# Patient Record
Sex: FEMALE | Race: WHITE | NOT HISPANIC OR LATINO | Employment: FULL TIME | URBAN - METROPOLITAN AREA
[De-identification: names, ages, dates, MRNs, and addresses within clinical notes are randomized per-mention and may not be internally consistent; named-entity substitution may affect disease eponyms.]

---
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
## 2021-08-05 ENCOUNTER — HOSPITAL ENCOUNTER (EMERGENCY)
Facility: CLINIC | Age: 21
Discharge: HOME | End: 2021-08-05
Attending: NURSE PRACTITIONER | Admitting: NURSE PRACTITIONER
Payer: COMMERCIAL

## 2021-08-05 ENCOUNTER — NURSE TRIAGE (OUTPATIENT)
Dept: NURSING | Facility: CLINIC | Age: 21
End: 2021-08-05

## 2021-08-05 ENCOUNTER — OFFICE VISIT (OUTPATIENT)
Dept: OBGYN | Facility: CLINIC | Age: 21
End: 2021-08-05
Payer: COMMERCIAL

## 2021-08-05 VITALS
SYSTOLIC BLOOD PRESSURE: 122 MMHG | OXYGEN SATURATION: 97 % | WEIGHT: 225 LBS | HEART RATE: 85 BPM | DIASTOLIC BLOOD PRESSURE: 75 MMHG | RESPIRATION RATE: 18 BRPM | TEMPERATURE: 98.1 F

## 2021-08-05 VITALS — SYSTOLIC BLOOD PRESSURE: 124 MMHG | WEIGHT: 225 LBS | DIASTOLIC BLOOD PRESSURE: 78 MMHG

## 2021-08-05 DIAGNOSIS — Z30.430 ENCOUNTER FOR INSERTION OF INTRAUTERINE CONTRACEPTIVE DEVICE: ICD-10-CM

## 2021-08-05 DIAGNOSIS — R10.2 PELVIC CRAMPING: ICD-10-CM

## 2021-08-05 DIAGNOSIS — Z11.3 ROUTINE SCREENING FOR STI (SEXUALLY TRANSMITTED INFECTION): Primary | ICD-10-CM

## 2021-08-05 PROCEDURE — 250N000013 HC RX MED GY IP 250 OP 250 PS 637: Performed by: NURSE PRACTITIONER

## 2021-08-05 PROCEDURE — 58300 INSERT INTRAUTERINE DEVICE: CPT | Performed by: ADVANCED PRACTICE MIDWIFE

## 2021-08-05 PROCEDURE — 99283 EMERGENCY DEPT VISIT LOW MDM: CPT

## 2021-08-05 PROCEDURE — 87491 CHLMYD TRACH DNA AMP PROBE: CPT | Performed by: ADVANCED PRACTICE MIDWIFE

## 2021-08-05 PROCEDURE — 87591 N.GONORRHOEAE DNA AMP PROB: CPT | Performed by: ADVANCED PRACTICE MIDWIFE

## 2021-08-05 RX ORDER — ALBUTEROL SULFATE 90 UG/1
2 AEROSOL, METERED RESPIRATORY (INHALATION)
COMMUNITY
Start: 2020-02-28 | End: 2021-10-05

## 2021-08-05 RX ORDER — COPPER 313.4 MG/1
1 INTRAUTERINE DEVICE INTRAUTERINE ONCE
Status: COMPLETED
Start: 2021-08-05 | End: 2021-08-05

## 2021-08-05 RX ORDER — IBUPROFEN 600 MG/1
600 TABLET, FILM COATED ORAL ONCE
Status: COMPLETED | OUTPATIENT
Start: 2021-08-05 | End: 2021-08-05

## 2021-08-05 RX ORDER — FLUTICASONE PROPIONATE 50 MCG
1 SPRAY, SUSPENSION (ML) NASAL DAILY
COMMUNITY

## 2021-08-05 RX ORDER — COPPER 313.4 MG/1
1 INTRAUTERINE DEVICE INTRAUTERINE ONCE
COMMUNITY

## 2021-08-05 RX ADMIN — COPPER 1 EACH: 313.4 INTRAUTERINE DEVICE INTRAUTERINE at 15:34

## 2021-08-05 RX ADMIN — IBUPROFEN 600 MG: 600 TABLET, FILM COATED ORAL at 21:04

## 2021-08-05 ASSESSMENT — ENCOUNTER SYMPTOMS
VOMITING: 1
NAUSEA: 1
APPETITE CHANGE: 1
ABDOMINAL PAIN: 1

## 2021-08-05 NOTE — PROGRESS NOTES
IUD Insertion:  CONSULT:    Marimar presents to clinic today for Nexplanon removal. Has had hair loss and mood swings on the Nexplanon, desires hormonal free contraception. Interested in the paragard IUD. After reviewing the benefits of inserting the Paragard prior to removing the Nexplanon, visit converted to IUD insertion.    Is a pregnancy test required: No.  Was a consent obtained?  Yes    Subjective: Marimar Domínguez is a 21 year old  presents for IUD and desires Paragard type IUD.    Patient has been given the opportunity to ask questions about all forms of birth control, including all options appropriate for Marimar Domínguez. Discussed that no method of birth control, except abstinence is 100% effective against pregnancy or sexually transmitted infection.     Marimar Domínguez understands she may have the IUD removed at any time. IUD should be removed by a health care provider.    The entire insertion procedure was reviewed with the patient, including care after placement.    No LMP recorded. Patient has had an implant.   HCG not indicated  No allergy to betadine or shellfish.   Patient accepts STI screening    /78 (BP Location: Left arm, Cuff Size: Adult Large)   Wt 102.1 kg (225 lb)     Pelvic Exam:   EG/BUS: normal genital architecture without lesions, erythema or abnormal secretions.   Vagina: speculum exam attempted with small speculum-- unable to visualize cervix. Attempted again with medium speculum-- cervix just visible to patient's L, partially obscured by vaginal tissue. Able to retract tissue with cotton swab to apply speculum. If patient tolerates, consider large speculum for next exam. Tissue moist, pink, rugae with physiologic discharge and secretions  Cervix: nulliparous, no lesions and pink, moist, closed, without lesion or CMT    PROCEDURE NOTE: -- IUD Insertion    Reason for Insertion: contraception    Under sterile technique, cervix was visualized with speculum and prepped with  Betadine solution swab x 3. Tenaculum was placed for stability. The uterus was gently straightened and sounded to 8.0 cm. IUD prepared for placement, and IUD inserted according to 's instructions without difficulty or significant resitance, and deployed at the fundus. The strings were visualized and trimmed to 3.0 cm from the external os. Tenaculum was removed and hemostasis noted. Speculum removed.  Patient tolerated procedure well.    Lot # [id number]  Exp: Jan 2027    EBL: minimal    Complications: none    ASSESSMENT:     ICD-10-CM    1. Routine screening for STI (sexually transmitted infection)  Z11.3 NEISSERIA GONORRHOEA PCR     CHLAMYDIA TRACHOMATIS PCR   2. Encounter for insertion of intrauterine contraceptive device  Z30.430 paragard intrauterine copper device     paragard intrauterine copper IUD device 1 each     INSERTION INTRAUTERINE DEVICE        PLAN:    Given 's handouts, including when to have IUD removed, list of danger s/sx, side effects and follow up recommended. Encouraged condom use for prevention of STD. Advised to call for any fever, for prolonged or severe pain or bleeding, abnormal vaginal discharge, or unable to palpate strings. She was advised to use pain medications (ibuprofen) as needed for mild to moderate pain. Advised to follow-up in clinic in 4-6 weeks for IUD string check if unable to find strings or as directed by provider.     RTC as desired for Nexplanon removal.    EULALIO Garcia CNM

## 2021-08-05 NOTE — LETTER
August 5, 2021      To Whom It May Concern:      Marimar Domínguez was seen in our Emergency Department today, 08/05/21.  I expect her condition to improve over the next 2 days.  She may return to work when improved.    Sincerely,        Case العلي RN

## 2021-08-06 LAB
C TRACH DNA SPEC QL NAA+PROBE: NEGATIVE
N GONORRHOEA DNA SPEC QL NAA+PROBE: NEGATIVE

## 2021-08-06 NOTE — ED PROVIDER NOTES
History   Chief Complaint:  Pelvic Pain     The history is provided by the patient.      Marimar Domínguez is a 21 year old female who presents with pelvic pain. The patient reports that she had an IUD placed at about 1300 today and has had cramping abdominal pain since then. She took two ibuprofen for the pain at 1400 and has been using a heat pack as well. She has also experienced spotting, vomiting x 1, and decreased appetite.     Review of Systems   Constitutional: Positive for appetite change.   Gastrointestinal: Positive for abdominal pain, nausea and vomiting.   Genitourinary: Positive for vaginal bleeding.   All other systems reviewed and are negative.      Allergies:  Kiwi  Pineapple    Medications:  The patient is not currently taking any daily medications.     Past Medical History:    Asthma   Scoliosis     Past Surgical History:    The patient denies past surgical history.     Family History:    The patient denies past family history.     Social History:  PCP: Katarina Quiñones  Presents to the ED with boyfriend.    Physical Exam     Patient Vitals for the past 24 hrs:   BP Temp Temp src Pulse Resp SpO2 Weight   08/05/21 2130 -- -- -- 85 -- 97 % --   08/05/21 2039 134/84 98.1  F (36.7  C) Temporal 102 16 99 % 102.1 kg (225 lb)       Physical Exam  General: Alert, No obvious discomfort, well kept, obese  Eyes: PERRL, conjunctivae pink no scleral icterus or conjunctival injection  ENT:   Moist mucus membranes, posterior oropharynx clear without erythema or exudates, No lymphadenopathy, Normal voice  Resp:  Lungs clear to auscultation bilaterally, no crackles/rubs/wheezes. Good air movement  CV:  Normal rate and rhythm, no murmurs/rubs/gallops  GI:  Abdomen soft and non-distended.  Normoactive BS.  No tenderness, guarding or rebound, No masses  Skin:  Warm, dry.  No rashes or petechiae  Musculoskeletal: No peripheral edema or calf tenderness, Normal gross ROM   Neuro: Alert and oriented to  person/place/time, normal sensation  Psychiatric: Normal affect, cooperative, good eye contact      Emergency Department Course     Emergency Department Course:  Reviewed:  I reviewed the patient's nursing notes, vitals, past medical records, Care Everywhere.     Assessments:  2042 I performed an assessment and examination of the patient as noted above.      2047 Findings and plan explained to the Patient. Patient discharged home with instructions regarding supportive care, medications, and reasons to return. The importance of close follow-up was reviewed.     Interventions:  2104 Ibuprofen 600 mg, PO    Disposition:  The patient was discharged to home.     Impression & Plan   Medical Decision Making:  Marimar Domínguez is a 21 year old female who presents today for evaluation of abdominal cramping and spotting post IUD placement.  Patient was initially advised that she might have some mild cramping and spotting therefore when she felt more intense cramping and had an episode of vomiting she was concerned and presented for evaluation.  On examination she has a nontender nonsurgical abdomen.  Patient has no evidence of discomfort.  She is ambulatory around the room without difficulty.  She is advised on appropriate expectations for cramping and spotting post IUD placement.  Appropriate doses of NSAIDs were discussed.  She will otherwise follow-up with primary care or obstetrician for any further concerns.  Return protocols were discussed.  She appears to be safe and appropriate for outpatient management follow-up and is discharged home.        Covid-19  Marimar Domínguez was evaluated during a global COVID-19 pandemic, which necessitated consideration that the patient might be at risk for infection with the SARS-CoV-2 virus that causes COVID-19.   Applicable protocols for evaluation were followed during the patient's care.     Diagnosis:    ICD-10-CM    1. Pelvic cramping  R10.2      Scribe Disclosure:  [facility],  am serving as a scribe at 8:42 PM on 8/5/2021 to document services personally performed by Ashwin Malik APRN based on my observations and the provider's statements to me.       Ashwin Malik APRN CNP  08/05/21 [id number]

## 2021-08-06 NOTE — TELEPHONE ENCOUNTER
Pt clled in states she had IUD put in today.  It is the copper one.  The Pt states she has abdominal cramp.  The symptom started 2 PM after the IUD insertion.  Has bleeding.  The pain is 10/10 on the scale.  The pain medication is not helping.  No fever.  The disposition is to be seen at the ED.  Care advice given per protocol.  Patient agrees with care advice given.   Agreed to call back if he has additional symptoms or questions.      Tico Ross [place] [facility] 8/5/2021 7:58 PM        Reason for Disposition    [1] SEVERE abdominal pain (e.g., excruciating) AND [2] present > 1 hour    Additional Information    Negative: Shock suspected (e.g., cold/pale/clammy skin, too weak to stand, low BP, rapid pulse)    Negative: Sounds like a life-threatening emergency to the triager    Negative: [1] Abdominal pain AND [2] pregnant < 20 weeks    Negative: [1] Vaginal bleeding AND [2] pregnant < 20 weeks    Negative: Vaginal discharge is main symptom    Protocols used: CONTRACEPTION - IUD SYMPTOMS AND [id number]

## 2021-08-06 NOTE — DISCHARGE INSTRUCTIONS
Again I suspect that you will have some cramping and spotting for the next up to a week.  Take ibuprofen 600 mg every 6 hours or naproxen 2 tablets twice daily as needed for discomfort.  You may also use Tylenol.  Contact your obstetrician if you have any further concerns.  Return to the ER if you develop fevers, uncontrolled nausea or vomiting, significant vaginal bleeding (more then a pad and hour for more than 3 hours), or for other concerns.

## 2021-09-18 ENCOUNTER — HEALTH MAINTENANCE LETTER (OUTPATIENT)
Age: 21
End: 2021-09-18

## 2021-10-05 ENCOUNTER — OFFICE VISIT (OUTPATIENT)
Dept: URGENT CARE | Facility: URGENT CARE | Age: 21
End: 2021-10-05
Payer: COMMERCIAL

## 2021-10-05 VITALS
DIASTOLIC BLOOD PRESSURE: 70 MMHG | HEART RATE: 96 BPM | OXYGEN SATURATION: 98 % | SYSTOLIC BLOOD PRESSURE: 110 MMHG | WEIGHT: 228.6 LBS | RESPIRATION RATE: 16 BRPM | TEMPERATURE: 98.5 F

## 2021-10-05 DIAGNOSIS — R05.9 COUGH IN ADULT: Primary | ICD-10-CM

## 2021-10-05 DIAGNOSIS — Z20.822 SUSPECTED COVID-19 VIRUS INFECTION: ICD-10-CM

## 2021-10-05 DIAGNOSIS — J45.21 MILD INTERMITTENT ASTHMA WITH ACUTE EXACERBATION: ICD-10-CM

## 2021-10-05 PROCEDURE — U0003 INFECTIOUS AGENT DETECTION BY NUCLEIC ACID (DNA OR RNA); SEVERE ACUTE RESPIRATORY SYNDROME CORONAVIRUS 2 (SARS-COV-2) (CORONAVIRUS DISEASE [COVID-19]), AMPLIFIED PROBE TECHNIQUE, MAKING USE OF HIGH THROUGHPUT TECHNOLOGIES AS DESCRIBED BY CMS-2020-01-R: HCPCS | Performed by: PHYSICIAN ASSISTANT

## 2021-10-05 PROCEDURE — 99204 OFFICE O/P NEW MOD 45 MIN: CPT | Performed by: PHYSICIAN ASSISTANT

## 2021-10-05 PROCEDURE — U0005 INFEC AGEN DETEC AMPLI PROBE: HCPCS | Performed by: PHYSICIAN ASSISTANT

## 2021-10-05 RX ORDER — ALBUTEROL SULFATE 90 UG/1
1 AEROSOL, METERED RESPIRATORY (INHALATION) EVERY 4 HOURS PRN
Qty: 6.7 G | Refills: 0 | Status: SHIPPED | OUTPATIENT
Start: 2021-10-05

## 2021-10-05 NOTE — PROGRESS NOTES
Assessment/Plan:    No acute distress or toxicity noted. Lungs CTAB, no signs of pneumonia. Suspect viral illness. Supportive treatments discussed.  No wheezing currently but pt notes wheezing intermittently. Rx albuterol for asthma exacerbation.    Discussed that symptoms do overlap with possible COVID-19, and patient was tested for this today. Isolate while awaiting test results.     See patient instructions below.  At the end of the encounter, I discussed results, diagnosis, medications. Discussed red flags for immediate return to clinic/ER, as well as indications for follow up if no improvement. Patient understood and agreed to plan. Patient was stable for discharge.      ICD-10-CM    1. Cough in adult  R05.9    2. Suspected COVID-19 virus infection  Z20.822 Symptomatic COVID-19 Virus (Coronavirus) by PCR Nose   3. Mild intermittent asthma with acute exacerbation  J45.21 albuterol (PROAIR HFA/PROVENTIL HFA/VENTOLIN HFA) 108 (90 Base) MCG/ACT inhaler         Return in about 1 week (around 10/12/2021).    MARIANO Huynh, RAFAEL  [facility]    ------------------------------------------------------------------------------------------------------------------------------------------------------------------------  HPI:  Marimar Domínguez is a 21 year old female wit history of asthma & allergic rhinitis who presents for evaluation of cough, sore throat, wheezing, fever, myalgias, and nausea onset 2 days ago. Tmax 101 F today. She takes Allegra & Flonase for allergies, and albuterol PRN for asthma but doesn't currently have an albuterol inhaler. No treatments tried. Patient reports no nasal congestion, loss of sense of taste or smell, headache, chest pain, shortness of breath, abdominal pain,  vomiting, diarrhea, rash, or any other symptoms. No known sick contacts/COVID exposure; she works in a .      No past medical history on file.    Vitals:    10/05/21 1313   BP: 110/70   BP  Location: Right arm   Patient Position: Chair   Cuff Size: Adult Large   Pulse: 96   Resp: 16   Temp: 98.5  F (36.9  C)   TempSrc: Oral   SpO2: 98%   Weight: 103.7 kg (228 lb 9.6 oz)       Physical Exam  Vitals and nursing note reviewed.   HENT:      Right Ear: Tympanic membrane normal.      Left Ear: Tympanic membrane normal.      Mouth/Throat:      Mouth: Mucous membranes are moist.      Pharynx: Oropharynx is clear.   Cardiovascular:      Rate and Rhythm: Normal rate and regular rhythm.      Heart sounds: Normal heart sounds.   Pulmonary:      Effort: Pulmonary effort is normal.      Breath sounds: Normal breath sounds.   Neurological:      Mental Status: She is alert.         Labs/Imaging:  No results found for this or any previous visit (from the past 24 hour(s)).      Patient Instructions     Adult Self-Care for Colds  Colds are caused by viruses. They can t be cured with antibiotics. However, you can relieve symptoms and support your body s efforts to heal itself. No matter which symptoms you have, be sure to drink plenty of fluids (water or clear soup); stop smoking and drinking alcohol; and get plenty of rest.      Understand a fever    Take your temperature several times a day. If your fever is 100.4 F (38.0 C) for more than a day, call your doctor.    Relax, lie down. Go to bed if you want. Just get off your feet and rest. Also, drink plenty of fluids to avoid dehydration.    Take acetaminophen or a nonsteroidal anti-inflammatory agent (NSAID), such as ibuprofen.  Treat a troubled nose kindly    Breathe steam or heated humidified air to open blocked nasal passages.  a hot shower or use a vaporizer. Be careful not to get burned by the steam.    Saline nasal sprays and Mucinex help open a stuffy nose. Antihistamines can also help, but they can cause side effects such as drowsiness and drying of the eyes, nose, and mouth.  Soothe a sore throat and cough    Gargle every 2 hours with 1/4 teaspoon of  salt dissolved in 1/2 cup of warm water. Suck on throat lozenges and cough drops to moisten your throat (those containing menthol work best).    Cough medicines are available but it is unclear how effective they actually are.    Try honey for cough    Take acetaminophen or an NSAID, such as ibuprofen to ease throat pain  Ease digestive problems    Put fluid back into your body. Take frequent sips of clear liquids such as water or broth. Do not drink beverages with a lot of sugar in them, such as juices and sodas. These can make diarrhea worse. Older children and adults can drink sports drinks.    As your appetite returns, you can resume your normal diet. Ask your doctor whether there are any foods you should avoid.  When to seek medical care  When you first notice symptoms, ask your health care provider if antiviral medications are appropriate. Antibiotics should not be taken for colds or flu. Also, call your doctor if you have any of the following symptoms or if you aren t feeling better after 7 days:    Shortness of breath    Pain or pressure in the chest or abdomen    Worsening symptoms, especially after a period of improvement    Fever of 100.4 F  (38.0 C) or higher, or fever that doesn t go down with medication    Sudden dizziness or confusion    Severe or continued vomiting    Signs of dehydration, including extreme thirst, dark urine, infrequent urination, dry mouth    Spotted, red, or very sore throat      7137-3462 [employer]. [address] [place] [address], [place] [address]. All rights reserved. This information is not intended as a substitute for professional medical care. Always follow your healthcare professional's instructions.    Your COVID test results should be available within 3 days, but please allow up to 1 week. If you have MyChart, your COVID test results will be visible there. If you do not have MyChart, you will be called if your test is positive and sent a letter if your test is  "negative.  Please continue to isolate yourself until you receive your results.    Discharge Instructions for COVID-19 Patients  You have--or may have--COVID-19. Please follow the instructions listed below.   If you have a weakened immune system, discuss with your doctor any other actions you need to take.  How can I protect others?  If you have symptoms (fever, cough, body aches or trouble breathing):    Stay home and away from others (self-isolate) until:  ? Your other symptoms have resolved (gotten better). And   ? You've had no fever--and no medicine that reduces fever--for 1 full day (24 hours). And   ? At least 10 days have passed since your symptoms started. (You may need to wait 20 days. Follow the advice of your care team.)  If you don't show symptoms, but testing showed that you have COVID-19:    Stay home and away from others (self-isolate) until at least 10 days have passed since the date of your first positive COVID-19 test.  During this time    Stay in your own room, even for meals. Use your own bathroom if you can.    Stay away from others in your home. No hugging, kissing or shaking hands. No visitors.    Don't go to work, school or anywhere else.    Clean \"high touch\" surfaces often (doorknobs, counters, handles). Use household cleaning spray or wipes.    You'll find a full list of  on the EPA website: www.epa.gov/pesticide-registration/list-n-disinfectants-use-against-sars-cov-2.    Cover your mouth and nose with a mask or other face covering to avoid spreading germs.    Wash your hands and face often. Use soap and water.    Caregivers in these groups are at risk for severe illness due to COVID-19:  ? People 65 years and older  ? People who live in a nursing home or long-term care facility  ? People with chronic disease (lung, heart, cancer, diabetes, kidney, liver, immunologic)  ? People who have a weakened immune system, including those who:    Are in cancer treatment    Take medicine that " weakens the immune system, such as corticosteroids    Had a bone marrow or organ transplant    Have an immune deficiency    Have poorly controlled HIV or AIDS    Are obese (body mass index of 40 or higher)    Smoke regularly    Caregivers should wear gloves while washing dishes, handling laundry and cleaning bedrooms and bathrooms.    Use caution when washing and drying laundry: Don't shake dirty laundry and use the warmest water setting that you can.    For more tips on managing your health at home, go to www.cdc.gov/coronavirus/2019-ncov/downloads/10Things.pdf.  How can I take care of myself at home?  1. Get lots of rest. Drink extra fluids (unless a doctor has told you not to).  2. Take Tylenol (acetaminophen) for fever or pain. If you have liver or kidney problems, ask your family doctor if it's okay to take Tylenol.   Adults can take either:   ? 650 mg (two 325 mg pills) every 4 to 6 hours, or   ? 1,000 mg (two 500 mg pills) every 8 hours as needed.  ? Note: Don't take more than 3,000 mg in one day. Acetaminophen is found in many medicines (both prescribed and over-the-counter medicines). Read all labels to be sure you don't take too much.   For children, check the Tylenol bottle for the right dose. The dose is based on the child's age or weight.  3. If you have other health problems (like cancer, heart failure, an organ transplant or severe kidney disease): Call your specialty clinic if you don't feel better in the next 2 days.  4. Know when to call 911. Emergency warning signs include:  ? Trouble breathing or shortness of breath  ? Pain or pressure in the chest that doesn't go away  ? Feeling confused like you haven't felt before, or not being able to wake up  ? Bluish-colored lips or face  5. Your doctor may have prescribed a blood thinner medicine. Follow their instructions.  Where can I get more information?     [facility] - About COVID-19:   [email or web address]    CDC - What to  Do If You're Sick: www.cdc.gov/coronavirus/2019-ncov/about/steps-when-sick.html    CDC - Ending Home Isolation: www.cdc.gov/coronavirus/2019-ncov/hcp/disposition-in-home-patients.html    CDC - Caring for Someone: www.cdc.gov/coronavirus/2019-ncov/if-you-are-sick/care-for-someone.html    [facility] - Interim Guidance for Hospital Discharge to Home: www.health.[facility].mn./diseases/coronavirus/hcp/hospdischarge.pdf    Below are the COVID-19 hotlines at the [place] Department of Health ([facility]). Interpreters are available.  ? For health questions: Call 110-387-6050 or 1-912.786.2558 (7 a.m. to 7 p.m.)  ? For questions about schools and childcare: Call 694-277-8839 or 1-426.800.8085 (7 a.m. to 7 p.m.)    For informational purposes only. Not to replace the advice of your health care provider. Clinically reviewed by Dr. Davie Moncada.   Copyright   2020 [employer]. All rights reserved. Clip [id number] - REV 01/05/21.

## 2021-10-05 NOTE — PATIENT INSTRUCTIONS
Adult Self-Care for Colds  Colds are caused by viruses. They can t be cured with antibiotics. However, you can relieve symptoms and support your body s efforts to heal itself. No matter which symptoms you have, be sure to drink plenty of fluids (water or clear soup); stop smoking and drinking alcohol; and get plenty of rest.      Understand a fever    Take your temperature several times a day. If your fever is 100.4 F (38.0 C) for more than a day, call your doctor.    Relax, lie down. Go to bed if you want. Just get off your feet and rest. Also, drink plenty of fluids to avoid dehydration.    Take acetaminophen or a nonsteroidal anti-inflammatory agent (NSAID), such as ibuprofen.  Treat a troubled nose kindly    Breathe steam or heated humidified air to open blocked nasal passages.  a hot shower or use a vaporizer. Be careful not to get burned by the steam.    Saline nasal sprays and Mucinex help open a stuffy nose. Antihistamines can also help, but they can cause side effects such as drowsiness and drying of the eyes, nose, and mouth.  Soothe a sore throat and cough    Gargle every 2 hours with 1/4 teaspoon of salt dissolved in 1/2 cup of warm water. Suck on throat lozenges and cough drops to moisten your throat (those containing menthol work best).    Cough medicines are available but it is unclear how effective they actually are.    Try honey for cough    Take acetaminophen or an NSAID, such as ibuprofen to ease throat pain  Ease digestive problems    Put fluid back into your body. Take frequent sips of clear liquids such as water or broth. Do not drink beverages with a lot of sugar in them, such as juices and sodas. These can make diarrhea worse. Older children and adults can drink sports drinks.    As your appetite returns, you can resume your normal diet. Ask your doctor whether there are any foods you should avoid.  When to seek medical care  When you first notice symptoms, ask your health care  provider if antiviral medications are appropriate. Antibiotics should not be taken for colds or flu. Also, call your doctor if you have any of the following symptoms or if you aren t feeling better after 7 days:    Shortness of breath    Pain or pressure in the chest or abdomen    Worsening symptoms, especially after a period of improvement    Fever of 100.4 F  (38.0 C) or higher, or fever that doesn t go down with medication    Sudden dizziness or confusion    Severe or continued vomiting    Signs of dehydration, including extreme thirst, dark urine, infrequent urination, dry mouth    Spotted, red, or very sore throat      6267-4411 [employer]. [address] [place], [place] [id number]. All rights reserved. This information is not intended as a substitute for professional medical care. Always follow your healthcare professional's instructions.    Your COVID test results should be available within 3 days, but please allow up to 1 week. If you have MyChart, your COVID test results will be visible there. If you do not have MyChart, you will be called if your test is positive and sent a letter if your test is negative.  Please continue to isolate yourself until you receive your results.    Discharge Instructions for COVID-19 Patients  You have--or may have--COVID-19. Please follow the instructions listed below.   If you have a weakened immune system, discuss with your doctor any other actions you need to take.  How can I protect others?  If you have symptoms (fever, cough, body aches or trouble breathing):    Stay home and away from others (self-isolate) until:  ? Your other symptoms have resolved (gotten better). And   ? You've had no fever--and no medicine that reduces fever--for 1 full day (24 hours). And   ? At least 10 days have passed since your symptoms started. (You may need to wait 20 days. Follow the advice of your care team.)  If you don't show symptoms, but testing showed that you have  "COVID-19:    Stay home and away from others (self-isolate) until at least 10 days have passed since the date of your first positive COVID-19 test.  During this time    Stay in your own room, even for meals. Use your own bathroom if you can.    Stay away from others in your home. No hugging, kissing or shaking hands. No visitors.    Don't go to work, school or anywhere else.    Clean \"high touch\" surfaces often (doorknobs, counters, handles). Use household cleaning spray or wipes.    You'll find a full list of  on the EPA website: www.epa.gov/pesticide-registration/list-n-disinfectants-use-against-sars-cov-2.    Cover your mouth and nose with a mask or other face covering to avoid spreading germs.    Wash your hands and face often. Use soap and water.    Caregivers in these groups are at risk for severe illness due to COVID-19:  ? People 65 years and older  ? People who live in a nursing home or long-term care facility  ? People with chronic disease (lung, heart, cancer, diabetes, kidney, liver, immunologic)  ? People who have a weakened immune system, including those who:    Are in cancer treatment    Take medicine that weakens the immune system, such as corticosteroids    Had a bone marrow or organ transplant    Have an immune deficiency    Have poorly controlled HIV or AIDS    Are obese (body mass index of 40 or higher)    Smoke regularly    Caregivers should wear gloves while washing dishes, handling laundry and cleaning bedrooms and bathrooms.    Use caution when washing and drying laundry: Don't shake dirty laundry and use the warmest water setting that you can.    For more tips on managing your health at home, go to www.cdc.gov/coronavirus/2019-ncov/downloads/10Things.pdf.  How can I take care of myself at home?  1. Get lots of rest. Drink extra fluids (unless a doctor has told you not to).  2. Take Tylenol (acetaminophen) for fever or pain. If you have liver or kidney problems, ask your family doctor " if it's okay to take Tylenol.   Adults can take either:   ? 650 mg (two 325 mg pills) every 4 to 6 hours, or   ? 1,000 mg (two 500 mg pills) every 8 hours as needed.  ? Note: Don't take more than 3,000 mg in one day. Acetaminophen is found in many medicines (both prescribed and over-the-counter medicines). Read all labels to be sure you don't take too much.   For children, check the Tylenol bottle for the right dose. The dose is based on the child's age or weight.  3. If you have other health problems (like cancer, heart failure, an organ transplant or severe kidney disease): Call your specialty clinic if you don't feel better in the next 2 days.  4. Know when to call 911. Emergency warning signs include:  ? Trouble breathing or shortness of breath  ? Pain or pressure in the chest that doesn't go away  ? Feeling confused like you haven't felt before, or not being able to wake up  ? Bluish-colored lips or face  5. Your doctor may have prescribed a blood thinner medicine. Follow their instructions.  Where can I get more information?    [facility] - About COVID-19:   [email or web address]    CDC - What to Do If You're Sick: www.cdc.gov/coronavirus/2019-ncov/about/steps-when-sick.html    CDC - Ending Home Isolation: www.cdc.gov/coronavirus/2019-ncov/hcp/disposition-in-home-patients.html    CDC - Caring for Someone: www.cdc.gov/coronavirus/2019-ncov/if-you-are-sick/care-for-someone.html    [facility] - Interim Guidance for Hospital Discharge to Home: www.health.[facility].mn.us/diseases/coronavirus/hcp/hospdischarge.pdf    Below are the COVID-19 hotlines at the [place] Department of Health ([facility]). Interpreters are available.  ? For health questions: Call 676-147-1104 or 1-811.555.4934 (7 a.m. to 7 p.m.)  ? For questions about schools and childcare: Call 760-288-0991 or 1-856.212.1299 (7 a.m. to 7 p.m.)    For informational purposes only. Not to replace the advice of your health care provider. Clinically  reviewed by Dr. Davie Moncada.   Copyright   2020 [facility]. All rights reserved. [employer] [id number] - REV 01/05/21.

## 2021-10-05 NOTE — LETTER
[facility]  [address]  [facility] [address]  Phone: [phone]  Fax: [phone]    October 5, 2021        Marimar Domínguez  [address] [facility] [place] [address]          To whom it may concern:    RE: Marimar Domínguez    Patient was seen and treated today at our clinic. Please excuse her from work today and until her COVID test comes back.     Please contact me for questions or concerns.      Sincerely,        Stacie Joy PA-C

## 2021-10-06 LAB — SARS-COV-2 RNA RESP QL NAA+PROBE: NEGATIVE

## 2022-08-02 ENCOUNTER — NURSE TRIAGE (OUTPATIENT)
Dept: NURSING | Facility: CLINIC | Age: 22
End: 2022-08-02

## 2022-08-03 NOTE — TELEPHONE ENCOUNTER
Korey IUD fell out. No abdominal pain other than cramps from period, not severe. No heavy vaginal bleeding. No other complaints. No fever. Advised see provider w/i 3 days and use alternate form of birth control such as condoms and spermicide . Pt voiced understanding and agreement.   '    Reason for Disposition    IUD came out (e.g., has IUD in her hand)    Additional Information    Negative: Shock suspected (e.g., cold/pale/clammy skin, too weak to stand, low BP, rapid pulse)    Negative: Sounds like a life-threatening emergency to the triager    Negative: Abdominal pain and pregnant < 20 weeks    Negative: Vaginal bleeding and pregnant < 20 weeks    Negative: Vaginal discharge is main symptom    Negative: SEVERE abdominal pain (e.g., excruciating) and present > 1 hour    Negative: SEVERE vaginal bleeding (e.g., soaking 2 pads or tampons per hour) and present 2 or more hours    Negative: MODERATE vaginal bleeding (e.g., soaking 1 pad or tampon per hour and present > 6 hours)    Negative: Constant abdominal pain and present > 2 hours    Negative: Patient sounds very sick or weak to the triager    Negative: Caller has URGENT question and triager unable to answer question    Negative: MILD abdominal pain (e.g., does not interfere with normal activities) that comes and goes (cramps) and present > 48 hours    Negative: Pregnant    Negative: Periods with > 6 soaked pads or tampons per day and lasts > 7 days    Negative: Worried that IUD is not in right place (e.g., not able to feel the IUD string, string longer than usual, can feel hard plastic of IUD)    Protocols used: CONTRACEPTION - IUD SYMPTOMS AND [id number]

## 2022-11-20 ENCOUNTER — HEALTH MAINTENANCE LETTER (OUTPATIENT)
Age: 22
End: 2022-11-20

## 2023-11-25 ENCOUNTER — HEALTH MAINTENANCE LETTER (OUTPATIENT)
Age: 23
End: 2023-11-25